# Patient Record
(demographics unavailable — no encounter records)

---

## 2017-01-28 NOTE — EDDOCDS
Nurse's Notes                                                                                     

[facility]                                                                         

Name: Suman Ross                                                                               

Age: 24 yrs                                                                                       

Sex: Male                                                                                         

: 1992                                                                                   

MRN: [id number]                                                                                     

Arrival Date: 2017                                                                          

Time: 19:44                                                                                       

Account#: [id number]                                                                              

Bed TR7                                                                                           

Private MD: NO PRIMARY PHYSICIAN, .                                                               

Diagnosis: Trigeminal neuralgia                                                                   

                                                                                                  

Presentation:                                                                                     

                                                                                             

19:57 Presenting complaint: Presenting complaint: Patient states: right jaw soreness on and   rs3 

      off for a week. Was seen by dentist. R/o dental carries. sudden onset of right jaw pain     

      started this evening radiates to right temple and cheek. constant pain. no relief with      

      Motrin.                                                                                     

20:00 Adult Sepsis Screening: The patient does not have new or worsening altered mentation.   rs3 

      Patient's respiratory rate is less than 22. Systolic blood pressure is greater than         

      100. Patient has a qSOFA score of 0- Negative Sepsis Screen. Suicide/Homicide risk          

      assessment- the patient denies having any suicidal and/or homicidal ideations and does      

      not present with any other emotional, behavioral or mental health complaints.       

      Status: Patient is not a  or  dependent. Transition of care:          

      patient was not received from another setting of care.                                      

20:00 Acuity: LINDA Level 4                                                                     rs3 

20:00 Method Of Arrival: Walkin/Carried/Asstd                                                 rs3 

                                                                                                  

Triage Assessment:                                                                                

20:03 General: Appears in no apparent distress. Pain: Location: right jaw. Pt Declines HIV    rs3 

      testing.                                                                                    

                                                                                                  

Historical:                                                                                       

- Allergies: no known allergies;                                                                  

- Home Meds:                                                                                      

1. none                                                                                         

- PMHx: none;                                                                                     

- PSHx: none;                                                                                     

- Social history: Smoking status: Patient states was never smoker of tobacco. No                  

barriers to communication noted, The patient speaks fluent English.                             

- Family history: Not pertinent.                                                                  

- : The pt / caregiver states he / she is not on anticoagulants. Home medication list             

is obtained from the patient.                                                                   

- Exposure Risk Screening:: None identified.                                                      

                                                                                                  

                                                                                                  

Screenin:01 Screening information is obtained from the patient. Fall risk: No risks identified.     jmb 

      Assistance ADL's: requires no assistance with activities of daily living. Abuse/DV          

      Screen: The patient / caregiver reports he/she is: not in a situation that causes fear,     

      pain or injury. Nutritional screening: No deficits noted. Advance Directives:               

      Currently, there is no health care proxy. There is no active DNR order. There is no         

      living will. There is no Power of . home support is adequate.                       

                                                                                                  

Assessment:                                                                                       

21:01 General: Patient instructed on discharge instructions. Patient asked if there were any  b 

      questions regarding dsihcarge, patient stated no. Patient signed discharge                  

      instructions. Patient discharged in stable condition..                                      

                                                                                                  

Vital Signs:                                                                                      

19:45  / 107; Pulse 87; Resp 16; Temp 96.7(O); Pulse Ox 100% on R/A; Weight 90.72 kg    elp 

      (R); Height 6 ft. 3 in. (190.50 cm) (R); Pain 6/10;                                         

20:47  / 96 LA Sitting (auto/reg); Pulse 81; Resp 18; Temp 97.2(O); Pulse Ox 99% on     ct3 

      R/A; Pain 6/10;                                                                             

19:45 Body Mass Index 25.00 (90.72 kg, 190.50 cm)                                             elp 

                                                                                                  

Vitals:                                                                                           

19:45 Log In Time: 2017 at 19:43.                                                 elp 

                                                                                                  

ED Course:                                                                                        

19:45 Patient visited by Mei Vila PCA.                                                  elp 

19:45 [facility] is Private Physician.                      elp 

19:45 NO PRIMARY PHYSICIAN, . is Private Physician.                                           elp 

19:45 Patient moved to Waiting                                                                elp 

19:53 Patient visited by Mei Vila PCA.                                                  elp 

19:53 Patient moved to Pre RCE                                                                elp 

20:03 Triage Initiated                                                                        rs3 

20:22 Patient moved to Triage 2                                                               ct3 

20:24 [facility]C is [facility]                                                   ck7 

20:24 Diego Dia DO is Attending Physician.                                            ck7 

20:24 Patient visited by Ochoa Vasques RPA-C.                                        ck7 

20:47 Patient visited by Connie Gutierrez PCA.                                              ct3 

20:48 Patient visited by Connie Gutierrez PCA.                                              ct3 

20:53 Cl Bowman is Referral Physician.                                                     ck7 

20:53 Jose Miguel Alegre is Referral Physician.                                              ck7 

20:58 Patient moved to TR7                                                                    ct3 

21:01 [facility] Payment Agreement was scanned into [employer] and attached to record.               ks16

21:01 The patient / caregiver is instructed regarding the plan of care and ED course.         jmb 

21:01 No IV's were initiated during this patient's visit. No procedures done that require     jmb 

      assistance.                                                                                 

                                                                                                  

Administered Medications:                                                                         

20:50 Drug: predniSONE 40 mg [prednisone 20 mg tablet (2 tabs)] Route: PO;                    kmg1

                                                                                                  

                                                                                                  

Order Results:                                                                                    

There are currently no results for this order.                                                    

Outcome:                                                                                          

20:54 Discharge ordered by Provider.                                                          ck7 

21:01 Discharge Assessment: Patient awake, alert and oriented x 3. No cognitive and/or        jmb 

      functional deficits noted. Patient verbalized understanding of disposition                  

      instructions. Patient awake and alert. obeys commands, Oriented to person, place and        

      time. Patient verbalized understanding of disposition instructions. Patient has no          

      functional deficits. patient administered narcotics - no. The following High Risk           

      Discharge criteria are identified: None. Discharged to home ambulatory. Condition:          

      stable Condition: improved. Discharge instructions given to patient, Instructed on          

      discharge instructions, follow up and referral plans. medication usage, Demonstrated        

      understanding of instructions, medications, Pt was receptive of discharge instructions/     

      teaching. Prescriptions given X 1. No special radiology studies were completed.             

      Property sent home with patient.                                                            

21:03 Patient left the ED.                                                                    b 

                                                                                                  

Signatures:                                                                                       

Elisabeth Lebron, RN                     RN   kmg1                                                 

Kati Craig,RN                   RN   rs3                                                  

Connie Gutierrez, PCA                  PCA  ct3                                                  

Ochoa Vasques, RPA-C            RPA-Cck7                                                  

Mei Vila, PCA                      PCA  Darryl Copeland,RN                        RN   fuadb                                                  

Fauzia Mendez, Reg                 Reg  ks16                                                 

                                                                                                  

Corrections: (The following items were deleted from the chart)                                    

20:03 19:57 Presenting complaint: rs3                                                         rs3 

20:48 20:47  / 96 Sitting Auto L Arm Regular; ct3                                       ct3 

                                                                                                  

**************************************************************************************************

MTDD

## 2017-01-28 NOTE — EDDOCDS
Physician Documentation                                                                           

[facility]                                                                         

Name: Suman Ross                                                                               

Age: 24 yrs                                                                                       

Sex: Male                                                                                         

: 1992                                                                                   

MRN: [id number]                                                                                     

Arrival Date: 2017                                                                          

Time: 19:44                                                                                       

Account#: [id number]                                                                              

Bed TR7                                                                                           

Private MD: NO PRIMARY PHYSICIAN, .                                                               

Disposition:                                                                                      

17 20:54 Discharged to Home/Self Care. Impression: Trigeminal neuralgia.                    

- Condition is Stable.                                                                            

- Discharge Instructions: Trigeminal Neuralgia.                                                   

- Prescriptions for Prednisone 20 mg Oral Tablet - take 2 tablet by ORAL route once               

daily for 5 days; 10 tablet.                                                                    

- Medication Reconciliation, Local Pharmacy Hours form.                                           

- Follow up: Cl Bowman; When: 2 - 3 days; Reason: Recheck today's complaints,                  

Continuance of care. Follow up: Jose Miguel Alegre; When: 2 - 3 days; Reason:                  

Recheck today's complaints, Continuance of care.                                                

- Problem is new.                                                                                 

- Symptoms have improved.                                                                         

- Notes: WATCH FOR REDNESS, SWELLING, FEVER OR OTHER CONCERNING SYMPTOMS, IF ANY OF               

THESE OCCUR RETURN TO THE ER, FOLLOW UP WITH NEUROLOGY                                          

                                                                                                  

                                                                                                  

Historical:                                                                                       

- Allergies: no known allergies;                                                                  

- Home Meds:                                                                                      

1. none                                                                                         

- PMHx: none;                                                                                     

- PSHx: none;                                                                                     

- Social history: Smoking status: Patient states was never smoker of tobacco. No                  

barriers to communication noted, The patient speaks fluent English.                             

- Family history: Not pertinent.                                                                  

- : The pt / caregiver states he / she is not on anticoagulants. Home medication list             

is obtained from the patient.                                                                   

- Exposure Risk Screening:: None identified.                                                      

                                                                                                  

                                                                                                  

Vital Signs:                                                                                      

                                                                                             

19:45  / 107; Pulse 87; Resp 16; Temp 96.7(O); Pulse Ox 100% on R/A; Weight 90.72 kg /  elp 

      200 lbs (R); Height 6 ft. 3 in. (190.50 cm) (R); Pain 6/10;                                 

20:47  / 96 LA Sitting (auto/reg); Pulse 81; Resp 18; Temp 97.2(O); Pulse Ox 99% on     ct3 

      R/A; Pain 6/10;                                                                             

19:45 Body Mass Index 25.00 (90.72 kg, 190.50 cm)                                             elp 

                                                                                                  

MDM:                                                                                              

20:43 predniSONE 40 mg PO once; administer with food or milk ordered.                         ck7 

20:43 Recheck Vital Signs, perform reassessment and enter into [employer] ordered.               ck7 

21:01 [facility] Payment Agreement was scanned into [employer] and attached to record.               ks16

21:02 Financial registration complete.                                                        ks16

                                                                                                  

Administered Medications:                                                                         

20:50 Drug: predniSONE 40 mg [prednisone 20 mg tablet (2 tabs)] Route: PO;                    kmg1

                                                                                                  

                                                                                                  

Signatures:                                                                                       

Kati Craig RN                   RN   rs3                                                  

Ochoa Vasques, MACK-C            RPA-Cck7                                                  

Darryl Carty RN                        RN   Fauzia Parra, Reg                 Reg  ks16                                                 

Elisabeth Lebron RN   kmg1                                                 

                                                                                                  

The chart was reviewed and I authenticate all verbal orders and agree with the evaluation and 
treatment provided.Attachments:

21:01 [facility] Payment Agreement                                                                ks16

                                                                                                  

**************************************************************************************************

MTDD

## 2017-01-30 NOTE — EDDOCDS
Physician Documentation                                                                           

[facility]                                                                         

Name: Suman Ross                                                                               

Age: 24 yrs                                                                                       

Sex: Male                                                                                         

: 1992                                                                                   

MRN: [id number]                                                                                     

Arrival Date: 2017                                                                          

Time: 19:44                                                                                       

Account#: [id number]                                                                              

Bed TR7                                                                                           

Private MD: NO PRIMARY PHYSICIAN, .                                                               

Disposition:                                                                                      

17 20:54 Discharged to Home/Self Care. Impression: Trigeminal neuralgia.                    

- Condition is Stable.                                                                            

- Discharge Instructions: Trigeminal Neuralgia.                                                   

- Prescriptions for Prednisone 20 mg Oral Tablet - take 2 tablet by ORAL route once               

daily for 5 days; 10 tablet.                                                                    

- Medication Reconciliation, Local Pharmacy Hours form.                                           

- Follow up: Cl Bowman; When: 2 - 3 days; Reason: Recheck today's complaints,                  

Continuance of care. Follow up: Jose Miguel Alegre; When: 2 - 3 days; Reason:                  

Recheck today's complaints, Continuance of care.                                                

- Problem is new.                                                                                 

- Symptoms have improved.                                                                         

- Notes: WATCH FOR REDNESS, SWELLING, FEVER OR OTHER CONCERNING SYMPTOMS, IF ANY OF               

THESE OCCUR RETURN TO THE ER, FOLLOW UP WITH NEUROLOGY                                          

                                                                                                  

                                                                                                  

Historical:                                                                                       

- Allergies: no known allergies;                                                                  

- Home Meds:                                                                                      

1. none                                                                                         

- PMHx: none;                                                                                     

- PSHx: none;                                                                                     

- Social history: Smoking status: Patient states was never smoker of tobacco. No                  

barriers to communication noted, The patient speaks fluent English.                             

- Family history: Not pertinent.                                                                  

- : The pt / caregiver states he / she is not on anticoagulants. Home medication list             

is obtained from the patient.                                                                   

- Exposure Risk Screening:: None identified.                                                      

                                                                                                  

                                                                                                  

Vital Signs:                                                                                      

                                                                                             

19:45  / 107; Pulse 87; Resp 16; Temp 96.7(O); Pulse Ox 100% on R/A; Weight 90.72 kg /  elp 

      200 lbs (R); Height 6 ft. 3 in. (190.50 cm) (R); Pain 6/10;                                 

20:47  / 96 LA Sitting (auto/reg); Pulse 81; Resp 18; Temp 97.2(O); Pulse Ox 99% on     ct3 

      R/A; Pain 6/10;                                                                             

19:45 Body Mass Index 25.00 (90.72 kg, 190.50 cm)                                             elp 

                                                                                                  

MDM:                                                                                              

20:43 predniSONE 40 mg PO once; administer with food or milk ordered.                         ck7 

20:43 Recheck Vital Signs, perform reassessment and enter into [employer] ordered.               ck7 

21: [facility] Payment Agreement was scanned into [employer] and attached to record.               ks16

21:02 Financial registration complete.                                                                                                                                                     

18:59 T-Sheet-- Draft Copy was scanned into [employer] and attached to record.                   kf3 

                                                                                                  

Administered Medications:                                                                         

                                                                                             

20:50 Drug: predniSONE 40 mg [prednisone 20 mg tablet (2 tabs)] Route: PO;                    kmg1

                                                                                                  

                                                                                                  

Signatures:                                                                                       

Price Ferrara, Reg                      Reg  kf3                                                  

Kati Craig,RN                   RN   rs3                                                  

Ochoa Vasques, RPA-C            RPA-Cck7                                                  

Darryl Carty,RN                        RN   Fauzia Parra, Reg                 Reg  ks16                                                 

Elisabeth Lebron RN   kmg1                                                 

                                                                                                  

The chart was reviewed and I authenticate all verbal orders and agree with the evaluation and 
treatment provided.Attachments:

21: [facility] Payment Agreement                                                                                                                                                             

18:59 T-Sheet-- Draft Copy                                                                    kf3 

                                                                                                  

**************************************************************************************************



*** Chart Complete ***
MTDD

## 2017-01-30 NOTE — EDDOCDS
Nurse's Notes                                                                                     

[facility]                                                                         

Name: Suman Ross                                                                               

Age: 24 yrs                                                                                       

Sex: Male                                                                                         

: 1992                                                                                   

MRN: [id number]                                                                                     

Arrival Date: 2017                                                                          

Time: 19:44                                                                                       

Account#: [id number]                                                                              

Bed TR7                                                                                           

Private MD: NO PRIMARY PHYSICIAN, .                                                               

Diagnosis: Trigeminal neuralgia                                                                   

                                                                                                  

Presentation:                                                                                     

                                                                                             

19:57 Presenting complaint: Presenting complaint: Patient states: right jaw soreness on and   rs3 

      off for a week. Was seen by dentist. R/o dental carries. sudden onset of right jaw pain     

      started this evening radiates to right temple and cheek. constant pain. no relief with      

      Motrin.                                                                                     

20:00 Adult Sepsis Screening: The patient does not have new or worsening altered mentation.   rs3 

      Patient's respiratory rate is less than 22. Systolic blood pressure is greater than         

      100. Patient has a qSOFA score of 0- Negative Sepsis Screen. Suicide/Homicide risk          

      assessment- the patient denies having any suicidal and/or homicidal ideations and does      

      not present with any other emotional, behavioral or mental health complaints.       

      Status: Patient is not a  or  dependent. Transition of care:          

      patient was not received from another setting of care.                                      

20:00 Acuity: LINDA Level 4                                                                     rs3 

20:00 Method Of Arrival: Walkin/Carried/Asstd                                                 rs3 

                                                                                                  

Triage Assessment:                                                                                

20:03 General: Appears in no apparent distress. Pain: Location: right jaw. Pt Declines HIV    rs3 

      testing.                                                                                    

                                                                                                  

Historical:                                                                                       

- Allergies: no known allergies;                                                                  

- Home Meds:                                                                                      

1. none                                                                                         

- PMHx: none;                                                                                     

- PSHx: none;                                                                                     

- Social history: Smoking status: Patient states was never smoker of tobacco. No                  

barriers to communication noted, The patient speaks fluent English.                             

- Family history: Not pertinent.                                                                  

- : The pt / caregiver states he / she is not on anticoagulants. Home medication list             

is obtained from the patient.                                                                   

- Exposure Risk Screening:: None identified.                                                      

                                                                                                  

                                                                                                  

Screenin:01 Screening information is obtained from the patient. Fall risk: No risks identified.     jmb 

      Assistance ADL's: requires no assistance with activities of daily living. Abuse/DV          

      Screen: The patient / caregiver reports he/she is: not in a situation that causes fear,     

      pain or injury. Nutritional screening: No deficits noted. Advance Directives:               

      Currently, there is no health care proxy. There is no active DNR order. There is no         

      living will. There is no Power of . home support is adequate.                       

                                                                                                  

Assessment:                                                                                       

21:01 General: Patient instructed on discharge instructions. Patient asked if there were any  b 

      questions regarding dsihcarge, patient stated no. Patient signed discharge                  

      instructions. Patient discharged in stable condition..                                      

                                                                                                  

Vital Signs:                                                                                      

19:45  / 107; Pulse 87; Resp 16; Temp 96.7(O); Pulse Ox 100% on R/A; Weight 90.72 kg    elp 

      (R); Height 6 ft. 3 in. (190.50 cm) (R); Pain 6/10;                                         

20:47  / 96 LA Sitting (auto/reg); Pulse 81; Resp 18; Temp 97.2(O); Pulse Ox 99% on     ct3 

      R/A; Pain 6/10;                                                                             

19:45 Body Mass Index 25.00 (90.72 kg, 190.50 cm)                                             elp 

                                                                                                  

Vitals:                                                                                           

19:45 Log In Time: 2017 at 19:43.                                                 elp 

                                                                                                  

ED Course:                                                                                        

19:45 Patient visited by Mei Vila PCA.                                                  elp 

19:45 [facility] is Private Physician.                      elp 

19:45 NO PRIMARY PHYSICIAN, . is Private Physician.                                           elp 

19:45 Patient moved to Waiting                                                                elp 

19:53 Patient visited by Mei Vila PCA.                                                  elp 

19:53 Patient moved to Pre RCE                                                                elp 

20:03 Triage Initiated                                                                        rs3 

20:22 Patient moved to Triage 2                                                               ct3 

20:24 [facility]                                                   ck7 

20:24 Diego Dia DO is Attending Physician.                                            ck7 

20:24 Patient visited by Ocoha Vasques RPA-C.                                        ck7 

20:47 Patient visited by Connie Gutierrez PCA.                                              ct3 

20:48 Patient visited by Connie Gutierrez PCA.                                              ct3 

20:53 Cl Bowman is Referral Physician.                                                     ck7 

20:53 Jose Miguel Alegre is Referral Physician.                                              ck7 

20:58 Patient moved to TR7                                                                    ct3 

21:01 [facility] Payment Agreement was scanned into [employer] and attached to record.               ks16

21:01 The patient / caregiver is instructed regarding the plan of care and ED course.         jmb 

21:01 No IV's were initiated during this patient's visit. No procedures done that require     jmb 

      assistance.                                                                                 

21:15 Patient name changed from Besadie\S\\S\Ross\S\ to Beau\S\ \S\Ross.                        
   EDMS

                                                                                             

18:59 T-Sheet-- Draft Copy was scanned into [employer] and attached to record.                   kf3 

                                                                                                  

Administered Medications:                                                                         

                                                                                             

20:50 Drug: predniSONE 40 mg [prednisone 20 mg tablet (2 tabs)] Route: PO;                    kmg1

                                                                                                  

                                                                                                  

Order Results:                                                                                    

There are currently no results for this order.                                                    

Outcome:                                                                                          

20:54 Discharge ordered by Provider.                                                          ck7 

21:01 Discharge Assessment: Patient awake, alert and oriented x 3. No cognitive and/or        jmb 

      functional deficits noted. Patient verbalized understanding of disposition                  

      instructions. Patient awake and alert. obeys commands, Oriented to person, place and        

      time. Patient verbalized understanding of disposition instructions. Patient has no          

      functional deficits. patient administered narcotics - no. The following High Risk           

      Discharge criteria are identified: None. Discharged to home ambulatory. Condition:          

      stable Condition: improved. Discharge instructions given to patient, Instructed on          

      discharge instructions, follow up and referral plans. medication usage, Demonstrated        

      understanding of instructions, medications, Pt was receptive of discharge instructions/     

      teaching. Prescriptions given X 1. No special radiology studies were completed.             

      Property sent home with patient.                                                            

21:03 Patient left the ED.                                                                    deloris 

                                                                                                  

Signatures:                                                                                       

Dispatcher [facility]                                                 

Elisabeth Lebron, RN                     RN   kmg1                                                 

Price Ferrara, Reg                      Reg  kf3                                                  

Kati CraigRN                   ALBAN   rs3                                                  

Connie Gutierrez, PCA                  PCA  ct3                                                  

Ochoa Vasques, RPA-C            RPA-Cck7                                                  

Mei Vila, PCA                      PCA  Darryl CopelandRN                        Fauzia Boyer, Reg                 Reg  ks16                                                 

                                                                                                  

Corrections: (The following items were deleted from the chart)                                    

20:03 19:57 Presenting complaint: rs3                                                         rs3 

20:48 20:47  / 96 Sitting Auto L Arm Regular; ct3                                       ct3 

                                                                                                  

**************************************************************************************************



*** Chart Complete ***
MTDD

## 2017-01-30 NOTE — EDDOCDS
Physician Documentation                                                                           

[facility]                                                                         

Name: Suman Ross                                                                               

Age: 24 yrs                                                                                       

Sex: Male                                                                                         

: 1992                                                                                   

MRN: [id number]                                                                                     

Arrival Date: 2017                                                                          

Time: 19:44                                                                                       

Account#: [id number]                                                                              

Bed TR7                                                                                           

Private MD: NO PRIMARY PHYSICIAN, .                                                               

Disposition:                                                                                      

17 20:54 Discharged to Home/Self Care. Impression: Trigeminal neuralgia.                    

- Condition is Stable.                                                                            

- Discharge Instructions: Trigeminal Neuralgia.                                                   

- Prescriptions for Prednisone 20 mg Oral Tablet - take 2 tablet by ORAL route once               

daily for 5 days; 10 tablet.                                                                    

- Medication Reconciliation, Local Pharmacy Hours form.                                           

- Follow up: Cl Bowman; When: 2 - 3 days; Reason: Recheck today's complaints,                  

Continuance of care. Follow up: Jose Miguel Alegre; When: 2 - 3 days; Reason:                  

Recheck today's complaints, Continuance of care.                                                

- Problem is new.                                                                                 

- Symptoms have improved.                                                                         

- Notes: WATCH FOR REDNESS, SWELLING, FEVER OR OTHER CONCERNING SYMPTOMS, IF ANY OF               

THESE OCCUR RETURN TO THE ER, FOLLOW UP WITH NEUROLOGY                                          

                                                                                                  

                                                                                                  

Historical:                                                                                       

- Allergies: no known allergies;                                                                  

- Home Meds:                                                                                      

1. none                                                                                         

- PMHx: none;                                                                                     

- PSHx: none;                                                                                     

- Social history: Smoking status: Patient states was never smoker of tobacco. No                  

barriers to communication noted, The patient speaks fluent English.                             

- Family history: Not pertinent.                                                                  

- : The pt / caregiver states he / she is not on anticoagulants. Home medication list             

is obtained from the patient.                                                                   

- Exposure Risk Screening:: None identified.                                                      

                                                                                                  

                                                                                                  

Vital Signs:                                                                                      

                                                                                             

19:45  / 107; Pulse 87; Resp 16; Temp 96.7(O); Pulse Ox 100% on R/A; Weight 90.72 kg /  elp 

      200 lbs (R); Height 6 ft. 3 in. (190.50 cm) (R); Pain 6/10;                                 

20:47  / 96 LA Sitting (auto/reg); Pulse 81; Resp 18; Temp 97.2(O); Pulse Ox 99% on     ct3 

      R/A; Pain 6/10;                                                                             

19:45 Body Mass Index 25.00 (90.72 kg, 190.50 cm)                                             elp 

                                                                                                  

MDM:                                                                                              

20:43 predniSONE 40 mg PO once; administer with food or milk ordered.                         [id number] 

20:43 Recheck Vital Signs, perform reassessment and enter into [employer] ordered.               ck7 

21: [facility] Payment Agreement was scanned into [employer] and attached to record.               ks16

21:02 Financial registration complete.                                                                                                                                                     

18:59 T-Sheet-- Draft Copy was scanned into [employer] and attached to record.                   kf3 

                                                                                                  

Administered Medications:                                                                         

                                                                                             

20:50 Drug: predniSONE 40 mg [prednisone 20 mg tablet (2 tabs)] Route: PO;                    kmg1

                                                                                                  

                                                                                                  

Signatures:                                                                                       

Price Ferrara, Reg                      Reg  kf3                                                  

Kati Craig,RN                   RN   rs3                                                  

Ochoa Vasques, RPA-C            [id number]                                                  

Darryl Carty,RN                        RN   Fauzia Parra, Reg                 Reg  ks16                                                 

Elisabeth Lebron RN   kmg1                                                 

                                                                                                  

The chart was reviewed and I authenticate all verbal orders and agree with the evaluation and 
treatment provided.Attachments:

21: [facility] Payment Agreement                                                                                                                                                             

18:59 T-Sheet-- Draft Copy                                                                    kf3 

                                                                                                  

**************************************************************************************************



*** Chart Complete ***
MTDD